# Patient Record
Sex: MALE | Race: BLACK OR AFRICAN AMERICAN | Employment: FULL TIME | URBAN - METROPOLITAN AREA
[De-identification: names, ages, dates, MRNs, and addresses within clinical notes are randomized per-mention and may not be internally consistent; named-entity substitution may affect disease eponyms.]

---

## 2020-04-13 ENCOUNTER — HOSPITAL ENCOUNTER (EMERGENCY)
Facility: CLINIC | Age: 25
Discharge: HOME | End: 2020-04-13
Attending: EMERGENCY MEDICINE | Admitting: EMERGENCY MEDICINE

## 2020-04-13 ENCOUNTER — APPOINTMENT (OUTPATIENT)
Dept: ULTRASOUND IMAGING | Facility: CLINIC | Age: 25
End: 2020-04-13
Attending: EMERGENCY MEDICINE

## 2020-04-13 VITALS
OXYGEN SATURATION: 100 % | SYSTOLIC BLOOD PRESSURE: 120 MMHG | WEIGHT: 150 LBS | BODY MASS INDEX: 20.32 KG/M2 | RESPIRATION RATE: 16 BRPM | DIASTOLIC BLOOD PRESSURE: 79 MMHG | HEART RATE: 71 BPM | HEIGHT: 72 IN

## 2020-04-13 DIAGNOSIS — R10.9 RIGHT FLANK PAIN: ICD-10-CM

## 2020-04-13 LAB
ALBUMIN SERPL-MCNC: 3.9 G/DL (ref 3.4–5)
ALBUMIN UR-MCNC: NEGATIVE MG/DL
ALP SERPL-CCNC: 81 U/L (ref 40–150)
ALT SERPL W P-5'-P-CCNC: 27 U/L (ref 0–70)
ANION GAP SERPL CALCULATED.3IONS-SCNC: <1 MMOL/L (ref 3–14)
APPEARANCE UR: CLEAR
AST SERPL W P-5'-P-CCNC: 17 U/L (ref 0–45)
BASOPHILS # BLD AUTO: 0 10E9/L (ref 0–0.2)
BASOPHILS NFR BLD AUTO: 0.8 %
BILIRUB SERPL-MCNC: 0.5 MG/DL (ref 0.2–1.3)
BILIRUB UR QL STRIP: NEGATIVE
BUN SERPL-MCNC: 15 MG/DL (ref 7–30)
CALCIUM SERPL-MCNC: 8.9 MG/DL (ref 8.5–10.1)
CHLORIDE SERPL-SCNC: 106 MMOL/L (ref 94–109)
CO2 SERPL-SCNC: 30 MMOL/L (ref 20–32)
COLOR UR AUTO: YELLOW
CREAT SERPL-MCNC: 0.88 MG/DL (ref 0.66–1.25)
DIFFERENTIAL METHOD BLD: ABNORMAL
EOSINOPHIL # BLD AUTO: 0.1 10E9/L (ref 0–0.7)
EOSINOPHIL NFR BLD AUTO: 1.8 %
ERYTHROCYTE [DISTWIDTH] IN BLOOD BY AUTOMATED COUNT: 13 % (ref 10–15)
GFR SERPL CREATININE-BSD FRML MDRD: >90 ML/MIN/{1.73_M2}
GLUCOSE SERPL-MCNC: 95 MG/DL (ref 70–99)
GLUCOSE UR STRIP-MCNC: NEGATIVE MG/DL
HCT VFR BLD AUTO: 45.2 % (ref 40–53)
HGB BLD-MCNC: 15.3 G/DL (ref 13.3–17.7)
HGB UR QL STRIP: NEGATIVE
IMM GRANULOCYTES # BLD: 0 10E9/L (ref 0–0.4)
IMM GRANULOCYTES NFR BLD: 0 %
KETONES UR STRIP-MCNC: NEGATIVE MG/DL
LEUKOCYTE ESTERASE UR QL STRIP: NEGATIVE
LIPASE SERPL-CCNC: 65 U/L (ref 73–393)
LYMPHOCYTES # BLD AUTO: 1.8 10E9/L (ref 0.8–5.3)
LYMPHOCYTES NFR BLD AUTO: 46 %
MCH RBC QN AUTO: 30.1 PG (ref 26.5–33)
MCHC RBC AUTO-ENTMCNC: 33.8 G/DL (ref 31.5–36.5)
MCV RBC AUTO: 89 FL (ref 78–100)
MONOCYTES # BLD AUTO: 0.3 10E9/L (ref 0–1.3)
MONOCYTES NFR BLD AUTO: 7.7 %
NEUTROPHILS # BLD AUTO: 1.7 10E9/L (ref 1.6–8.3)
NEUTROPHILS NFR BLD AUTO: 43.7 %
NITRATE UR QL: NEGATIVE
NRBC # BLD AUTO: 0 10*3/UL
NRBC BLD AUTO-RTO: 0 /100
PH UR STRIP: 6 PH (ref 5–7)
PLATELET # BLD AUTO: 232 10E9/L (ref 150–450)
POTASSIUM SERPL-SCNC: 4.1 MMOL/L (ref 3.4–5.3)
PROT SERPL-MCNC: 7.3 G/DL (ref 6.8–8.8)
RBC # BLD AUTO: 5.08 10E12/L (ref 4.4–5.9)
RBC #/AREA URNS AUTO: 0 /HPF (ref 0–2)
SODIUM SERPL-SCNC: 136 MMOL/L (ref 133–144)
SOURCE: NORMAL
SP GR UR STRIP: 1.01 (ref 1–1.03)
UROBILINOGEN UR STRIP-MCNC: NORMAL MG/DL (ref 0–2)
WBC # BLD AUTO: 3.9 10E9/L (ref 4–11)
WBC #/AREA URNS AUTO: 2 /HPF (ref 0–5)

## 2020-04-13 PROCEDURE — 85025 COMPLETE CBC W/AUTO DIFF WBC: CPT | Performed by: EMERGENCY MEDICINE

## 2020-04-13 PROCEDURE — 25000132 ZZH RX MED GY IP 250 OP 250 PS 637: Performed by: EMERGENCY MEDICINE

## 2020-04-13 PROCEDURE — 76705 ECHO EXAM OF ABDOMEN: CPT

## 2020-04-13 PROCEDURE — 83690 ASSAY OF LIPASE: CPT | Performed by: EMERGENCY MEDICINE

## 2020-04-13 PROCEDURE — 80053 COMPREHEN METABOLIC PANEL: CPT | Performed by: EMERGENCY MEDICINE

## 2020-04-13 PROCEDURE — 99284 EMERGENCY DEPT VISIT MOD MDM: CPT | Mod: 25

## 2020-04-13 PROCEDURE — 81001 URINALYSIS AUTO W/SCOPE: CPT | Performed by: EMERGENCY MEDICINE

## 2020-04-13 RX ORDER — ACETAMINOPHEN 500 MG
1000 TABLET ORAL ONCE
Status: COMPLETED | OUTPATIENT
Start: 2020-04-13 | End: 2020-04-13

## 2020-04-13 RX ADMIN — ACETAMINOPHEN 1000 MG: 500 TABLET, FILM COATED ORAL at 19:08

## 2020-04-13 ASSESSMENT — MIFFLIN-ST. JEOR: SCORE: 1698.4

## 2020-04-13 ASSESSMENT — ENCOUNTER SYMPTOMS
HEMATURIA: 0
DYSURIA: 0
FREQUENCY: 0
FLANK PAIN: 1
DIFFICULTY URINATING: 0
SHORTNESS OF BREATH: 0

## 2020-04-13 NOTE — ED PROVIDER NOTES
"  History     Chief Complaint:  Flank Pain    The history is provided by the patient.      Marika Glasgow is a 25 year old male who presents with deep, burning right flank pain that has been ongoing for a month. The patient reports recently worsened pain while trying to sleep, prompting evaluation. He stated that his pain used to be when he was sitting for extended periods of time, but now his pain is constant and waxing/waning. He says that the area often feels \"warm\" and reports his pain as a 3/4 out of 10 regularly but exacerbates to a 6 or 7 at its worst points. He states that he has been drinking plenty of water to help reduce pain, but does not report relief of symptoms. He has not taken any over-the-counter medication for his pain. He denies rash, skin change, fall, injury to the area, chest pain, shortness of breath, dysuria, increased urinary frequency, urgency, hematuria, or difficulty urinating. He produced his last stool today without abnormality. He does not have a history of kidney stones. He denies use of alcohol and drugs. The patient denies any other underlying medical conditions. Of note, patient does work at an [employer].     Allergies:  No Known Drug Allergies      Medications:    No current outpatient medications on file.    Past Medical History:    No past medical history on file.    Past Surgical History:    History reviewed. No pertinent past surgical history.    Family History:    History reviewed. No pertinent family history.      Social History:  The patient was unaccompanied to the ED.  Smoking Status: no  Smokeless Tobacco: no  Alcohol Use: no  Drug Use: no     Review of Systems   Respiratory: Negative for shortness of breath.    Cardiovascular: Negative for chest pain.   Genitourinary: Positive for flank pain. Negative for difficulty urinating, dysuria, frequency, hematuria and urgency.   Skin: Negative for rash.   All other systems reviewed and are negative.    Physical Exam "     Patient Vitals for the past 24 hrs:   BP Pulse Resp SpO2 Height Weight   04/13/20 2050 120/79 71 16 100 % -- --   04/13/20 1843 122/81 77 16 100 % 1.829 m (6') 68 kg (150 lb)       Physical Exam  General: Alert and cooperative with exam. Patient in mild distress. Normal mentation.  Head:  Scalp is NC/AT  Eyes:  No scleral icterus, PERRL  ENT:  The external nose and ears are normal. The oropharynx is normal and without erythema; mucus membranes are moist.   Neck:  Normal range of motion without rigidity.  CV:  Regular rate and rhythm    No pathologic murmur   Resp:  Breath sounds are clear bilaterally    Non-labored, no retractions or accessory muscle use  GI:  Abdomen is soft, no distension, no tenderness. No peritoneal signs. Mild right upper quadrant/ right flank TTP w/o skin changes.   MS:  No lower extremity edema   Skin:  Warm and dry, No rash or lesions noted.   Neuro: Oriented x 3. No gross motor deficits.      Emergency Department Course     Imaging:  Radiology findings were communicated with the patient who voiced understanding of the findings.    Abdomen US, limited (RUQ only)  IMPRESSION:   1.  The gallbladder is not distended. The patient is not fasting for   the exam, which is likely the reason for underdistention.   2.  No pericholecystic fluid. No sonographic Corado's sign.   3.  No bile duct dilatation.   4.  Normal appearance of the liver.   Reading per radiology     Laboratory:  Laboratory findings were communicated with the patient who voiced understanding of the findings.    CBC: WBC 3.9(L) o/w within normal limits (HGB 15.3, )  CMP: Anion gap <1(L) o/w within normal limits (Creatinine 0.88)  Lipase: 65(L)    UA with Microscopic: AWNL    Interventions:  1908 Tylenol 1000 mg oral  Emergency Department Course:  Past medical records, nursing notes, and vitals reviewed.    (1844)   I performed an exam of the patient as documented above.     IV was inserted and blood was drawn for  laboratory testing, results above.    The patient provided a urine sample here in the emergency department. This was sent for laboratory testing, findings above.    The patient was sent for a Abdomen US, limited (RUQ only) while in the emergency department, results above.     (2048)   I rechecked the patient and discussed the results of his workup thus far. Discussed plan of care and patient will be discharged.    Findings and plan explained to the Patient. Patient discharged home with instructions regarding supportive care, medications, and reasons to return. The importance of close follow-up was reviewed.    I personally reviewed the laboratory and imaging results with the Patient and answered all related questions prior to discharge.     Impression & Plan     Medical Decision Making:     Marika Glasgow is a 25 year old male who presents with right flank pain.  He denies any associated symptoms.   A broad differential diagnosis was considered including diverticulitis, ureterolithiasis, tumor, colitis, cholecystitis, aneurysm, dissection, hydronephrosis, pneumonia, rib fracture, UTI, pyelonephritis amongst many other etiologies.  I doubt PE given patient is PERC negative. The patient works at an [employer]; pain most likely from an abdominal strain.   The workup in the ED is at this point negative.  No etiology for the patients pain is found at this point and my suspicion of an intraabdominal or intrathoracic catastrophe or other worrisome etiology is very low.  I will not therefore admit for serial exams and further workup.  Patient is hemodynamically stable in ED. Plan is to discharge home with primary care physician f/u if not improving or return to ED at that time. Recommended supportive care (ice, APAP/NSAIDs) Flank pain handout given.  All questions were answered prior to discharge.        Diagnosis:    ICD-10-CM    1. Right flank pain  R10.9        Disposition:  Discharged to home.    [place]  Disclosure:  I, Arnulfo Murphy, am serving as a scribe at 6:44 PM on 4/13/2020 to document services personally performed by Tato Garzon DO based on my observations and the provider's statements to me.     Scribe Disclosure:  I, Mary Hidalgo, am serving as a scribe at 8:24 PM on 4/13/2020 to document services personally performed by Tato Garzon DO based on my observations and the provider's statements to me.     4/13/2020    EMERGENCY DEPARTMENT       Tato Garzon DO  04/14/20 1533

## 2020-04-13 NOTE — LETTER
April 13, 2020      To Whom It May Concern:      Marika Glasgow was seen in our Emergency Department today, 04/13/20.  I expect his condition to improve over the next 2 days.  He may return to work/school when improved.    Sincerely,        Tato Garzon, DO

## 2020-04-13 NOTE — ED AVS SNAPSHOT
Emergency Department  [address] [place] [address]  Phone:  [phone]  Fax:  [phone]                                    Marika Glasgow   MRN: [id number]    Department:   Emergency Department   Date of Visit:  4/13/2020           After Visit Summary Signature Page    I have received my discharge instructions, and my questions have been answered. I have discussed any challenges I see with this plan with the nurse or doctor.    ..........................................................................................................................................  Patient/Patient Representative Signature      ..........................................................................................................................................  Patient Representative Print Name and Relationship to Patient    ..................................................               ................................................  Date                                   Time    ..........................................................................................................................................  Reviewed by Signature/Title    ...................................................              ..............................................  Date                                               Time          22EPIC Rev 08/18